# Patient Record
Sex: FEMALE
[De-identification: names, ages, dates, MRNs, and addresses within clinical notes are randomized per-mention and may not be internally consistent; named-entity substitution may affect disease eponyms.]

---

## 2017-03-19 ENCOUNTER — HOSPITAL ENCOUNTER (EMERGENCY)
Dept: HOSPITAL 42 - ED | Age: 32
Discharge: HOME | End: 2017-03-19
Payer: COMMERCIAL

## 2017-03-19 VITALS — BODY MASS INDEX: 25.9 KG/M2

## 2017-03-19 VITALS
OXYGEN SATURATION: 99 % | HEART RATE: 117 BPM | TEMPERATURE: 97.6 F | SYSTOLIC BLOOD PRESSURE: 150 MMHG | RESPIRATION RATE: 18 BRPM | DIASTOLIC BLOOD PRESSURE: 75 MMHG

## 2017-03-19 DIAGNOSIS — S83.91XA: Primary | ICD-10-CM

## 2017-03-19 DIAGNOSIS — S93.401A: ICD-10-CM
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
## 2017-03-19 NOTE — RAD
PROCEDURE:  Right Foot Radiographs.



HISTORY:

pain  



COMPARISON:

None available.



FINDINGS:



BONES:

No acute displaced fracture. 



JOINTS:

No dislocation. 



SOFT TISSUES:

Unremarkable. No evidence of radiopaque foreign body. 



OTHER FINDINGS:

None.



IMPRESSION:

No acute displaced fracture, dislocation, or significant joint 

effusion identified.



If symptoms persist, or if there is continued clinical concern, x-ray 

follow-up in 7-10 days should be considered.

## 2017-03-19 NOTE — ED PDOC
Arrival/HPI





- General


Chief Complaint: Lower Extremity Problem/Injury


Time Seen by Provider: 03/19/17 16:18


Historian: Patient, Family





- History of Present Illness


Narrative History of Present Illness (Text): 


03/19/17 16:48


Patient reports that she tripped and fell prior to arrival injuring her R knee, 

ankle and foot. Otherwise:  (-) "pop", (-) instability, (-) other injury, (-) 

head injury, (-) LOC, (-) neck / back pain.   





Tdap UTD


PMD Qian








Past Medical History





- Provider Review


Nursing Documentation Reviewed: Yes





- Infectious Disease


Hx of Infectious Diseases: None





- Tetanus Immunization


Tetanus Immunization: Unknown





- Past Medical History


Past Medical History: No Previous





- Cardiac


Hx Cardiac Disorders: No





- Pulmonary


Hx Respiratory Disorders: No





- Neurological


Hx Neurological Disorder: No





- HEENT


Hx HEENT Disorder: No





- Renal


Hx Renal Disorder: No





- Endocrine/Metabolic


Hx Endocrine Disorders: No





- Hematological/Oncological


Hx Blood Disorders: No





- Integumentary


Hx Dermatological Disorder: Yes


Other/Comment: bite tiffany to left thumb and multiple scratches to left lower arm 

with swelling





- Musculoskeletal/Rheumatological


Hx Musculoskeletal Disorders: No





- Gastrointestinal


Hx Gastrointestinal Disorders: No





- Genitourinary/Gynecological


Hx Genitourinary Disorders: No





- Psychiatric


Hx Psychophysiologic Disorder: No


Hx Anxiety: No


Hx Bipolar Disorder: No


Hx Depression: No


Hx Emotional Abuse: No


Hx Hallucinations: No


Hx Panic Disorder: No


Hx Post Traumatic Stress Disorder: No


Hx Psychosis: No


Hx Physical Abuse: No


Hx Schizophrenia: No


Hx Sexual Abuse: No


Hx Substance Use: No (pt denies drug use when questioned)





- Anesthesia


Hx Anesthesia: No


Hx Anesthesia Reactions: No


Hx Malignant Hyperthermia: No





- Suicidal Assessment


Feels Threatened In Home Enviroment: No





Family/Social History





- Physician Review


Nursing Documentation Reviewed: Yes


Family/Social History: No Known Family HX


Smoking Status: Light Smoker < 10 Cigarettes Daily


Hx Alcohol Use: Yes (beer 4x's a week)


Frequency of alcohol use: Socially


Hx Substance Use: No (pt denies drug use when questioned)


Substance used: COCAINE


Hx Substance Use Treatment: No





Allergies/Home Meds


Allergies/Adverse Reactions: 


Allergies





vancomycin Allergy (Verified 02/08/16 14:07)


 ITCHING











Review of Systems





- Review of Systems


Constitutional: Normal.  absent: Fatigue, Weight Change, Fevers


Musculoskeletal: Normal, Arthralgias.  absent: Back Pain, Neck Pain


Skin: Normal.  absent: Rash, Pruritis, Skin Lesions


Neurological: Normal.  absent: Headache, Dizziness





Physical Exam





- Physical Exam


Narrative Physical Exam (Text): 





03/19/17 16:54


GENERAL APPEARANCE: Patient is awake, alert, oriented x 3, in moderate painful 

distress. Strong odor of alcohol noted on patient's breath.


SKIN:  Warm, dry; (-) cyanosis.


LOWER EXTREMITY: 


R Knee: (+) abrasion and ecchymosis noted to the anterior knee, (-) tenderness, 

(-) edema with (-) effusion.  Able to extend actively to 0 degrees; (-) 

instability on valgus or varus stress.  Drawer sign (-).  (-) distal 

neurovascular deficit.  2 point discrimination.  


R Ankle: (-) swelling, (+) tenderness; (+) limited range of motion secondary to 

pain.  Achilles tendon intact and nontender.  


R Foot:  (-) swelling, (+) tenderness of the dorsal mid foot; (+) limited range 

of motion secondary to pain.


R Hip, thigh, leg and ankle:  (-) tenderness or limitation of motion. 


CARDIOVASCULAR:  (+) distal pulse.  


NEUROLOGIC:  (+) distal sensation.





Vital Signs











  Temp Pulse Resp BP Pulse Ox


 


 03/19/17 16:10  97.6 F  117 H  18  150/75  99














Medical Decision Making


ED Course and Treatment: 


03/19/17 16:57


32 yo F s/p trip and fall, injuring her R knee, ankle and foot. 





Plan: 


-- XR R knee / ankle / foot


-- Naprosyn








XR right knee: no fracture, no dislocation, as read by PA


XR right ankle: no fracture, no dislocation, as read by PA


XR right foot: no fracture, no dislocation, as read by PA


Patient advised that official radiology read of XR is still pending and will 

call the patient if there is any discrepancy within 24 hours.  





Abrasions cleaned and dressed. Ace wrap applied to knee, ankle and foot. 

Patient instructed on crutch walking. Repeat pulse 105.


Based on history, exam and diagnostic results plan will be for outpatient 

follow up.  Prescription provided.


Patient states she fully agrees with and understands discharge instructions. 

States that she agrees with the plan and disposition. Verbalized and repeated 

discharge instructions and plan. I have given the patient opportunity to ask 

any additional questions. 





Follow up with primary care physician in 1-2 days without fail. Advised to take 

medication as prescribed. Return to the emergency room at any time for any new 

or worsening symptoms.





Patient left with family members, was observed ambulating without the use of 

her crutches with a normal gait. 











- RAD Interpretation


Radiology Orders: 








03/19/17 16:28


KNEE RIGHT 2 VIEWS (AP & LAT) [RAD] Stat 





03/19/17 16:29


ANKLE RIGHT 3 VIEWS ROUTINE [RAD] Stat 


FOOT RIGHT 3 VIEWS ROUTINE [RAD] Stat 














- Medication Orders


Current Medication Orders: 











Discontinued Medications





Naproxen (Anaprox Ds)  550 mg PO ONCE STA


   Stop: 03/19/17 16:29


   Last Admin: 03/19/17 16:37  Dose: 550 MG











- PA / NP / Resident Statement


MD/ has reviewed & agrees with the documentation as recorded.





Disposition/Present on Arrival





- Present on Arrival


Any Indicators Present on Arrival: No


History of DVT/PE: No


History of Uncontrolled Diabetes: No


Urinary Catheter: No


History of Decub. Ulcer: No


History Surgical Site Infection Following: None





- Disposition


Have Diagnosis and Disposition been Completed?: Yes


Diagnosis: 


 Knee sprain, Ankle sprain


Disposition: HOME/ ROUTINE


Disposition Time: 17:37


Patient Plan: Discharge


Condition: GOOD


Discharge Instructions (ExitCare):  Knee Sprain (ED), Ankle Sprain (ED)


Print Language: ENGLISH


Additional Instructions: 


Thank you for letting us take care of you today. You were treated for knee 

sprain, ankle sprain. The emergency medical care you received today was 

directed at your acute symptoms. If you were prescribed any medication, please 

fill it and take as directed. It may take several days for your symptoms to 

resolve. Return to the Emergency Department if your symptoms worsen, do not 

improve, or if you have any other problems.





Please contact your doctor in 2 days for re-evaluation and follow up / or call 

one of the physicians/clinics you have been referred to that are listed on the 

Patient Visit Information form that is included in your discharge packet. Bring 

any paperwork you were given at discharge with you along with any medications 

you are taking to your follow up visit. Our treatment cannot replace ongoing 

medical care by a primary care provider (PCP) outside of the emergency 

department.





Thank you for allowing the [facility] team to be part of your care today.








Prescriptions: 


Naproxen 500 mg PO BID #30 tab


Referrals: 


Tommy Laughlin MD [Primary Care Provider] - Follow up with primary


Forms:  WORK NOTE

## 2017-03-19 NOTE — RAD
PROCEDURE:  Right Ankle Radiographs.



HISTORY:

pain  



COMPARISON:

None available



FINDINGS:



BONES:

No acute displaced fracture. 



JOINTS:

No dislocation. Ankle mortise maintained. Talar dome intact



SOFT TISSUES:

Unremarkable. No evidence of radiopaque foreign body. 



OTHER FINDINGS:

None.



IMPRESSION:

No acute displaced fracture, dislocation, or significant joint 

effusion identified.



If symptoms persist or if there is clinical concern, x-ray follow-up 

in 7-10 days should be considered.

## 2017-03-19 NOTE — RAD
PROCEDURE:  Right Knee Radiographs.



HISTORY:



COMPARISON:

No prior.



FINDINGS:



BONES:

No acute displaced fracture. 



JOINTS:

No dislocation. 



JOINT EFFUSION:

No significant joint effusion. 



OTHER FINDINGS:

None.



IMPRESSION:

No acute displaced fracture, dislocation, or significant joint 

effusion identified.



If symptoms persist, or if there is continued clinical concern, x-ray 

follow-up in 7-10 days should be considered.

## 2018-07-06 ENCOUNTER — HOSPITAL ENCOUNTER (EMERGENCY)
Dept: HOSPITAL 42 - ED | Age: 33
LOS: 1 days | Discharge: HOME | End: 2018-07-07
Payer: COMMERCIAL

## 2018-07-06 VITALS — BODY MASS INDEX: 25.9 KG/M2

## 2018-07-06 DIAGNOSIS — O46.90: ICD-10-CM

## 2018-07-06 DIAGNOSIS — F17.210: ICD-10-CM

## 2018-07-06 DIAGNOSIS — O03.4: Primary | ICD-10-CM

## 2018-07-06 PROCEDURE — 85610 PROTHROMBIN TIME: CPT

## 2018-07-06 PROCEDURE — 88305 TISSUE EXAM BY PATHOLOGIST: CPT

## 2018-07-06 PROCEDURE — 85025 COMPLETE CBC W/AUTO DIFF WBC: CPT

## 2018-07-06 PROCEDURE — 87086 URINE CULTURE/COLONY COUNT: CPT

## 2018-07-06 PROCEDURE — 76817 TRANSVAGINAL US OBSTETRIC: CPT

## 2018-07-06 PROCEDURE — 86900 BLOOD TYPING SEROLOGIC ABO: CPT

## 2018-07-06 PROCEDURE — 81001 URINALYSIS AUTO W/SCOPE: CPT

## 2018-07-06 PROCEDURE — 86850 RBC ANTIBODY SCREEN: CPT

## 2018-07-06 PROCEDURE — 80053 COMPREHEN METABOLIC PANEL: CPT

## 2018-07-06 PROCEDURE — 84702 CHORIONIC GONADOTROPIN TEST: CPT

## 2018-07-06 PROCEDURE — 99284 EMERGENCY DEPT VISIT MOD MDM: CPT

## 2018-07-06 PROCEDURE — 83690 ASSAY OF LIPASE: CPT

## 2018-07-06 PROCEDURE — 85730 THROMBOPLASTIN TIME PARTIAL: CPT

## 2018-07-06 PROCEDURE — 83735 ASSAY OF MAGNESIUM: CPT

## 2018-07-06 NOTE — ED PDOC
Arrival/HPI





- General


Historian: Patient





- History of Present Illness


Time/Duration: 4-6 hours


Symptom Onset: Sudden


Symptom Course: Worsening


Activities at Onset: Rest


Context: Home





<Jose León - Last Filed: 18 03:13>





<Negro Bobo DO - Last Filed: 18 05:47>





- General


Chief Complaint: Female Genitourinary


Time Seen by Provider: 18 23:36





- History of Present Illness


Narrative History of Present Illness (Text): 





This a is 33 year old female  with no significant PMH presenting with 

painful vaginal bleeding that started a few hours ago. Patient was at home 

resting, and the pain started suddenly, and is currently a 10/10. She also 

vomited 4 times NBNB and currently feels nauseas. Patient used 4 pads today and 

saw bright red blood and clots. LMP was . Patient had scheduled 

pelvic US yesterday, but does not know the results. Patient has never been 

pregnant and has no history of miscarriages. She had one previous . She 

currently does not take any medications. OBGYN doctor is Dr. Adams. She denies 

chest pain, SOB, headaches, and fevers. 








 (Jose León)





Past Medical History





- Provider Review


Nursing Documentation Reviewed: Yes





- Infectious Disease


Hx of Infectious Diseases: None





- Tetanus Immunization


Tetanus Immunization: Unknown





- Past Medical History


Past Medical History: No Previous





- Cardiac


Hx Cardiac Disorders: No





- Pulmonary


Hx Respiratory Disorders: No





- Neurological


Hx Neurological Disorder: No





- HEENT


Hx HEENT Disorder: No





- Renal


Hx Renal Disorder: No





- Endocrine/Metabolic


Hx Endocrine Disorders: No





- Hematological/Oncological


Hx Blood Disorders: No





- Integumentary


Hx Dermatological Disorder: Yes


Other/Comment: bite tiffany to left thumb and multiple scratches to left lower arm 

with swelling





- Musculoskeletal/Rheumatological


Hx Musculoskeletal Disorders: No





- Gastrointestinal


Hx Gastrointestinal Disorders: No





- Genitourinary/Gynecological


Hx Genitourinary Disorders: No





- Psychiatric


Hx Psychophysiologic Disorder: No


Hx Anxiety: No


Hx Bipolar Disorder: No


Hx Depression: No


Hx Emotional Abuse: No


Hx Hallucinations: No


Hx Panic Disorder: No


Hx Post Traumatic Stress Disorder: No


Hx Psychosis: No


Hx Physical Abuse: No


Hx Schizophrenia: No


Hx Sexual Abuse: No


Hx Substance Use: No (pt denies drug use when questioned)





- Anesthesia


Hx Anesthesia: No


Hx Anesthesia Reactions: No


Hx Malignant Hyperthermia: No





- Suicidal Assessment


Feels Threatened In Home Enviroment: No





<Jose León - Last Filed: 18 03:13>





Family/Social History





- Physician Review


Nursing Documentation Reviewed: Yes


Family/Social History: Unknown Family HX


Smoking Status: Light Smoker < 10 Cigarettes Daily


Hx Alcohol Use: Yes (beer 4x's a week)


Hx Substance Use: No (pt denies drug use when questioned)


Substance used: COCAINE


Hx Substance Use Treatment: No





<Jose León - Last Filed: 18 03:13>





Allergies/Home Meds





<Jose León - Last Filed: 18 03:13>





<Negro Bobo DO - Last Filed: 18 05:47>


Allergies/Adverse Reactions: 


Allergies





vancomycin Allergy (Verified 18 23:42)


 ITCHING








Home Medications: 


 Home Meds











 Medication  Instructions  Recorded  Confirmed


 


No Known Home Med  18














Review of Systems





- Physician Review


All systems were reviewed & negative as marked: Yes





- Review of Systems


Constitutional: absent: Fevers


Eyes: Normal


Respiratory: Normal.  absent: SOB


Cardiovascular: Normal.  absent: Chest Pain, Palpitations


Gastrointestinal: Nausea, Vomiting.  absent: Hematemesis


Genitourinary Female: Vaginal Bleeding, Vaginal Discharge


Musculoskeletal: Normal


Skin: Normal


Neurological: Normal.  absent: Headache, Dizziness


Endocrine: Normal


Hemo/Lymphatic: Normal


Psychiatric: Normal





<Jose León - Last Filed: 18 03:13>





Physical Exam


Vital Signs Reviewed: Yes


Temperature: Afebrile


Blood Pressure: Normal


Pulse: Regular


Respiratory Rate: Tachypneic


Appearance: Positive for: Well-Appearing, Non-Toxic, Comfortable


Pain Distress: None


Mental Status: Positive for: Alert and Oriented X 3





- Systems Exam


Head: Present: Atraumatic, Normocephalic


Pupils: Present: PERRL


Extroacular Muscles: Present: EOMI


Conjunctiva: Present: Normal


Mouth: Present: Moist Mucous Membranes


Neck: Present: Normal Range of Motion


Respiratory/Chest: Present: Clear to Auscultation, Good Air Exchange.  No: 

Respiratory Distress, Accessory Muscle Use


Cardiovascular: Present: Regular Rate and Rhythm, Normal S1, S2.  No: Murmurs


Abdomen: No: Tenderness, Distention, Peritoneal Signs


Genitourinary/Pelvic Exam: Present: Other (deferred)


Back: Present: Normal Inspection


Upper Extremity: Present: Normal Inspection.  No: Cyanosis, Edema


Lower Extremity: Present: Normal Inspection.  No: Edema


Neurological: Present: Speech Normal, Motor Func Grossly Intact, Normal Sensory 

Function


Skin: Present: Warm, Dry, Normal Color.  No: Rashes


Psychiatric: Present: Alert, Normal Insight, Normal Concentration





<Jose León - Last Filed: 18 03:13>





Vital Signs











  Temp Pulse Resp BP Pulse Ox


 


 18 02:09  97.1 F L  84  17  105/72  98


 


 18 00:20     128/78 


 


 18 23:42  97.5 F L  88  25 H   100














Medical Decision Making





<Jose León - Last Filed: 18 03:13>





<Negro Bobo DO - Last Filed: 18 05:47>


ED Course and Treatment: 





18 00:05





Impression: This is a 33 year old female with no significant PMH presenting 

with vaginal bleeding and vaginal pain that started earlier today.





Plan:


-CBC, CMP


-B-HcG


-Lipase, Mg


-PT/PTT


-U/A


-Transvaginal US





Progress:





18 00:57


Ultrasound shows no free fluid and mass is present in the cervix. 





18 02:17


-Patient passed clot, and patient states pain is "100% better."


-Patient is scheduled to see her Obgyn, Dr. Adams, on Monday. 








 (Jose León)





- Lab Interpretations


Lab Results: 











 18 23:50 





 18 23:50 





 Lab Results





18 01:00: Urine Color Red, Urine Appearance Bloody, Urine pH 5.5, Ur 

Specific Gravity >= 1.030, Urine Protein >=300 H, Urine Glucose (UA) Negative, 

Urine Ketones Trace H, Urine Blood Large H, Urine Nitrate Positive H, Urine 

Bilirubin Negative, Urine Urobilinogen 1.0 H, Ur Leukocyte Esterase Small H, 

Urine RBC Tntc, Urine WBC 25 - 30, Ur Epithelial Cells 3 - 4, Urine Bacteria 

Small


18 00:10: Blood Type A POSITIVE, Antibody Screen Negative, BBK History 

Checked Patient has bt


18 23:50: Beta HCG, Quant 5454.30 H


18 23:50: Sodium 139, Potassium 3.8, Chloride 103, Carbon Dioxide 20 L, 

Anion Gap 20, BUN 10, Creatinine 0.6 L, Est GFR ( Amer) > 60, Est GFR (

Non-Af Amer) > 60, Random Glucose 125 H, Calcium 10.1, Magnesium 1.8, Total 

Bilirubin 0.2, AST 27, ALT 29, Alkaline Phosphatase 77, Total Protein 7.8, 

Albumin 4.7, Globulin 3.1, Albumin/Globulin Ratio 1.5, Lipase 77


18 23:50: PT 10.7, INR 0.94, APTT 26.7


18 23:50: WBC 13.5 H D, RBC 4.19, Hgb 12.4, Hct 36.5, MCV 87.1, MCH 29.6, 

MCHC 34.0, RDW 13.8, Plt Count 230, MPV 10.4, Gran % 70.2 H, Lymph % (Auto) 22.0

, Mono % (Auto) 6.6 H, Eos % (Auto) 0.9 L, Baso % (Auto) 0.3, Gran # 9.51 H, 

Lymph # (Auto) 3.0, Mono # (Auto) 0.9 H, Eos # (Auto) 0.1, Baso # (Auto) 0.04











- RAD Interpretation


Radiology Orders: 











18 23:47


OB TRANSVAGINAL PREGNANCY [US] Stat 














- Medication Orders


Current Medication Orders: 














Discontinued Medications





Acetaminophen (Tylenol 325mg Tab)  975 mg PO STAT STA


   Stop: 18 23:47


   Last Admin: 18 00:21  Dose: 975 mg





Sodium Chloride (Sodium Chloride 0.9%)  1,000 mls @ 999 mls/hr IV .Q1H1M STA


   Stop: 18 00:52


   Last Admin: 18 00:21  Dose: 999 mls/hr





eMAR Start Stop


 Document     18 00:21  IT  (Rec: 18 00:21  IT  [id number])


     Intravenous Solution


      Start Date                                 18


      Start Time                                 00:21














- PA / NP / Resident Statement


KATHI has reviewed & agrees with the documentation as recorded.


KATHI has examined the patient and agrees with the treatment plan.





<Jose León - Last Filed: 18 03:13>





Disposition/Present on Arrival





- Present on Arrival


Any Indicators Present on Arrival: No


History of DVT/PE: No


History of Uncontrolled Diabetes: No


Urinary Catheter: No


History of Decub. Ulcer: No


History Surgical Site Infection Following: None





- Disposition


Have Diagnosis and Disposition been Completed?: Yes


Disposition Time: 02:00





<Jose León - Last Filed: 18 03:13>





- Disposition


Disposition Time: 01:15





<Negro Bobo DO - Last Filed: 18 05:47>





- Disposition


Diagnosis: 


 Vaginal bleeding during pregnancy, Incomplete 





Disposition: HOME/ ROUTINE


Condition: GOOD


Discharge Instructions (ExitCare):  Bleeding With Pregnancy (DC)


Additional Instructions: 





SKYLER CHRIS, thank you for letting us take care of you today. The emergency 

medical care you received today was directed at your acute symptoms. If you 

were prescribed any medication, please fill it and take as directed. It may 

take several days for your symptoms to resolve. Return to the Emergency 

Department if your symptoms worsen, do not improve, or if you have any other 

problems.





Please contact your doctor or call one of the physicians/clinics you have been 

referred to that are listed on the Patient Visit Information form that is 

included in your discharge packet. Bring any paperwork you were given at 

discharge with you along with any medications you are taking to your follow up 

visit. Our treatment cannot replace ongoing medical care by a primary care 

provider outside of the emergency department.





Thank you for allowing the [facility] team to be part of your care today.














Please follow up with your OB/GYN doctor as scheduled in 2 days for re-

evaluation and further management.


Referrals: 


Tommy Laughlin MD [Primary Care Provider] - Follow up with primary


Forms:  [employer]trip (English)

## 2018-07-07 VITALS
HEART RATE: 84 BPM | OXYGEN SATURATION: 98 % | SYSTOLIC BLOOD PRESSURE: 105 MMHG | TEMPERATURE: 97.1 F | RESPIRATION RATE: 17 BRPM | DIASTOLIC BLOOD PRESSURE: 72 MMHG

## 2018-07-07 LAB
ALBUMIN SERPL-MCNC: 4.7 G/DL
ALBUMIN/GLOB SERPL: 1.5 {RATIO}
ALT SERPL-CCNC: 29 U/L
APPEARANCE UR: (no result)
APTT BLD: 26.7 SECONDS
AST SERPL-CCNC: 27 U/L
BACTERIA #/AREA URNS HPF: (no result) /[HPF]
BASOPHILS # BLD AUTO: 0.04 K/MM3
BASOPHILS NFR BLD: 0.3 %
BILIRUB UR-MCNC: NEGATIVE MG/DL
BUN SERPL-MCNC: 10 MG/DL
CALCIUM SERPL-MCNC: 10.1 MG/DL
COLOR UR: (no result)
EOSINOPHIL # BLD: 0.1 10*3/UL
EOSINOPHIL NFR BLD: 0.9 %
ERYTHROCYTE [DISTWIDTH] IN BLOOD BY AUTOMATED COUNT: 13.8 %
GFR NON-AFRICAN AMERICAN: > 60
GLUCOSE UR STRIP-MCNC: NEGATIVE MG/DL
GRANULOCYTES # BLD: 9.51 10*3/UL
GRANULOCYTES NFR BLD: 70.2 %
HGB BLD-MCNC: 12.4 G/DL
INR PPP: 0.94
LEUKOCYTE ESTERASE UR-ACNC: (no result) LEU/UL
LIPASE SERPL-CCNC: 77 U/L
LYMPHOCYTES # BLD: 3 10*3/UL
LYMPHOCYTES NFR BLD AUTO: 22 %
MCH RBC QN AUTO: 29.6 PG
MCHC RBC AUTO-ENTMCNC: 34 G/DL
MCV RBC AUTO: 87.1 FL
MONOCYTES # BLD AUTO: 0.9 10*3/UL
MONOCYTES NFR BLD: 6.6 %
PH UR STRIP: 5.5 [PH]
PLATELET # BLD: 230 10^3/UL
PMV BLD AUTO: 10.4 FL
PROT UR STRIP-MCNC: >=300 MG/DL
PROTHROMBIN TIME: 10.7 SECONDS
RBC # BLD AUTO: 4.19 10^6/UL
RBC # UR STRIP: (no result) /UL
RBC #/AREA URNS HPF: (no result) /HPF
SP GR UR STRIP: >= 1.03
UROBILINOGEN UR STRIP-ACNC: 1 E.U./DL
WBC # BLD AUTO: 13.5 10^3/UL
WBC #/AREA URNS HPF: (no result) /HPF

## 2018-07-07 NOTE — US
HISTORY:

Vaginal bleeding with pregnancy. 



LMP 04/28/2018. 



COMPARISON:

None available.



TECHNIQUE:

Standard protocol for this study/examination.



FINDINGS:



UTERUS:

Measures 4.3 x 5.6 x 10.3 cm. Normal in size and appearance. No 

fibroid or other mass lesion seen.



ENDOMETRIUM:

Measures 12.7 mm in diameter. Thickened and heterogeneous 

endometrium. 



CERVIX:

Fluid and debris identified in the cervical canal.



RIGHT OVARY:

Measures 1.9 x 2.3 x 2.2 cm. No solid mass. Normal flow. 



LEFT OVARY:

Measures 1.4 x 2.9 x 1.9 cm. No solid mass. Flow could not be 

documented on the left likely related to inability of the patient to 

remain still foreign adequate Doppler assessment. 



FREE FLUID:

No significant free fluid noted.



OTHER FINDINGS:

None. 



IMPRESSION:

Fluid/ debris in the lower uterine canal and cervix.



No visible products of conception noted.



________________________________________________



Concordant results (preliminary interpretation) provided by [facility].



Procedure Completed: 00:04



Preliminary ([facility]) Report: Dictated and Authenticated: 00:27



Final Interpretation: 09:36